# Patient Record
Sex: FEMALE | Race: OTHER | NOT HISPANIC OR LATINO | URBAN - METROPOLITAN AREA
[De-identification: names, ages, dates, MRNs, and addresses within clinical notes are randomized per-mention and may not be internally consistent; named-entity substitution may affect disease eponyms.]

---

## 2017-06-28 ENCOUNTER — EMERGENCY (EMERGENCY)
Facility: HOSPITAL | Age: 32
LOS: 1 days | Discharge: HOME | End: 2017-06-28
Attending: EMERGENCY MEDICINE | Admitting: EMERGENCY MEDICINE
Payer: COMMERCIAL

## 2017-06-28 VITALS
TEMPERATURE: 98 F | RESPIRATION RATE: 18 BRPM | DIASTOLIC BLOOD PRESSURE: 83 MMHG | OXYGEN SATURATION: 98 % | HEART RATE: 76 BPM | SYSTOLIC BLOOD PRESSURE: 122 MMHG
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
## 2017-06-28 DIAGNOSIS — S93.401A SPRAIN OF UNSPECIFIED LIGAMENT OF RIGHT ANKLE, INITIAL ENCOUNTER: ICD-10-CM

## 2017-06-28 DIAGNOSIS — M25.571 PAIN IN RIGHT ANKLE AND JOINTS OF RIGHT FOOT: ICD-10-CM
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
## 2017-06-28 PROCEDURE — 73610 X-RAY EXAM OF ANKLE: CPT

## 2017-06-28 PROCEDURE — 99283 EMERGENCY DEPT VISIT LOW MDM: CPT | Mod: 25

## 2017-06-28 PROCEDURE — 73610 X-RAY EXAM OF ANKLE: CPT | Mod: 26

## 2017-06-28 PROCEDURE — 73610 X-RAY EXAM OF ANKLE: CPT | Mod: 26,RT

## 2017-06-28 RX ORDER — LISDEXAMFETAMINE DIMESYLATE 70 MG/1
1 CAPSULE ORAL
Qty: 0 | Refills: 0 | COMMUNITY

## 2017-06-28 RX ORDER — IBUPROFEN 200 MG
600 TABLET ORAL ONCE
Qty: 0 | Refills: 0 | Status: COMPLETED | OUTPATIENT
Start: 2017-06-28 | End: 2017-06-28

## 2017-06-28 RX ADMIN — Medication 600 MILLIGRAM(S): at 05:15

## 2017-06-28 NOTE — ED PROVIDER NOTE - OBJECTIVE STATEMENT
32F no PMH c/o R ankle pain. pt states she slipped while walking down steps yesterday and foot inverted.  c/o worsening ankle pain. placed ice on it.

## 2017-06-28 NOTE — ED PROVIDER NOTE - DIAGNOSTIC INTERPRETATION
ER Physician: Mirlande  INTERPRETATION:  no acute fracture; +soft tissue swelling noted; normal bony alignment.

## 2017-06-28 NOTE — ED ADULT NURSE NOTE - OBJECTIVE STATEMENT
pt aaox3, c/o right ankle swelling s/p injury yesterday at work. pt states she woke up at 3am in severe pain. pt currently taking contraceptive pill and states "no chance of pregnancy." Pt refusing to give urine sample at this time.

## 2017-06-28 NOTE — ED PROVIDER NOTE - MUSCULOSKELETAL MINIMAL EXAM
+TTP and swelling to R lateral malleolus, no pain to proximal fibula, no pain to foot, 2+dp, able to move toes, sensation intact to foot

## 2017-06-28 NOTE — ED ADULT NURSE NOTE - CHPI ED SYMPTOMS NEG
no chills/no vomiting/no nausea/no decreased eating/drinking/no dizziness/no weakness/no numbness/no tingling/no fever

## 2022-01-01 NOTE — ED PROVIDER NOTE - PROGRESS NOTE DETAILS
yes
given aircast, michael teachingALDO  I have discussed the discharge plan with the patient. The patient agrees with the plan, as discussed.  The patient understands Emergency Department diagnosis is a preliminary diagnosis often based on limited information and that the patient must adhere to the follow-up plan as discussed.  The patient understands that if the symptoms worsen  the patient may return to the Emergency Department at any time for further evaluation and treatment.

## 2024-04-11 PROBLEM — Z00.00 ENCOUNTER FOR PREVENTIVE HEALTH EXAMINATION: Status: ACTIVE | Noted: 2024-04-11

## 2024-04-25 ENCOUNTER — APPOINTMENT (OUTPATIENT)
Dept: NEUROLOGY | Facility: CLINIC | Age: 39
End: 2024-04-25